# Patient Record
Sex: MALE | Race: OTHER | HISPANIC OR LATINO | URBAN - METROPOLITAN AREA
[De-identification: names, ages, dates, MRNs, and addresses within clinical notes are randomized per-mention and may not be internally consistent; named-entity substitution may affect disease eponyms.]

---

## 2022-04-10 ENCOUNTER — HISTORICAL (OUTPATIENT)
Dept: ADMINISTRATIVE | Facility: HOSPITAL | Age: 32
End: 2022-04-10
Payer: MEDICAID

## 2022-04-29 VITALS
OXYGEN SATURATION: 96 % | SYSTOLIC BLOOD PRESSURE: 124 MMHG | WEIGHT: 174.63 LBS | DIASTOLIC BLOOD PRESSURE: 77 MMHG | HEIGHT: 68 IN | BODY MASS INDEX: 26.47 KG/M2

## 2022-06-05 ENCOUNTER — HOSPITAL ENCOUNTER (EMERGENCY)
Facility: HOSPITAL | Age: 32
Discharge: HOME | End: 2022-06-05
Attending: STUDENT IN AN ORGANIZED HEALTH CARE EDUCATION/TRAINING PROGRAM
Payer: MEDICAID

## 2022-06-05 VITALS
OXYGEN SATURATION: 98 % | RESPIRATION RATE: 16 BRPM | TEMPERATURE: 98 F | SYSTOLIC BLOOD PRESSURE: 117 MMHG | DIASTOLIC BLOOD PRESSURE: 66 MMHG | HEART RATE: 52 BPM

## 2022-06-05 DIAGNOSIS — R11.2 NAUSEA VOMITING AND DIARRHEA: Primary | ICD-10-CM

## 2022-06-05 DIAGNOSIS — R19.7 NAUSEA VOMITING AND DIARRHEA: Primary | ICD-10-CM

## 2022-06-05 LAB
ALBUMIN SERPL-MCNC: 4.4 GM/DL (ref 3.5–5)
ALBUMIN/GLOB SERPL: 1.4 RATIO (ref 1.1–2)
ALP SERPL-CCNC: 96 UNIT/L (ref 40–150)
ALT SERPL-CCNC: 13 UNIT/L (ref 0–55)
APPEARANCE UR: CLEAR
AST SERPL-CCNC: 16 UNIT/L (ref 5–34)
BACTERIA #/AREA URNS AUTO: ABNORMAL /HPF
BASOPHILS # BLD AUTO: 0.01 X10(3)/MCL (ref 0–0.2)
BASOPHILS NFR BLD AUTO: 0.1 %
BILIRUB UR QL STRIP.AUTO: NEGATIVE MG/DL
BILIRUBIN DIRECT+TOT PNL SERPL-MCNC: 0.5 MG/DL
BUN SERPL-MCNC: 7.8 MG/DL (ref 8.9–20.6)
CALCIUM SERPL-MCNC: 9.7 MG/DL (ref 8.4–10.2)
CHLORIDE SERPL-SCNC: 99 MMOL/L (ref 98–107)
CO2 SERPL-SCNC: 31 MMOL/L (ref 22–29)
COLOR UR AUTO: ABNORMAL
CREAT SERPL-MCNC: 0.94 MG/DL (ref 0.73–1.18)
EOSINOPHIL # BLD AUTO: 0.01 X10(3)/MCL (ref 0–0.9)
EOSINOPHIL NFR BLD AUTO: 0.1 %
ERYTHROCYTE [DISTWIDTH] IN BLOOD BY AUTOMATED COUNT: 12.4 % (ref 11.5–17)
GLOBULIN SER-MCNC: 3.1 GM/DL (ref 2.4–3.5)
GLUCOSE SERPL-MCNC: 120 MG/DL (ref 74–100)
GLUCOSE UR QL STRIP.AUTO: NORMAL MG/DL
HCT VFR BLD AUTO: 43.8 % (ref 42–52)
HGB BLD-MCNC: 14.8 GM/DL (ref 14–18)
HYALINE CASTS #/AREA URNS LPF: ABNORMAL /LPF
IMM GRANULOCYTES # BLD AUTO: 0.03 X10(3)/MCL (ref 0–0.02)
IMM GRANULOCYTES NFR BLD AUTO: 0.3 % (ref 0–0.43)
KETONES UR QL STRIP.AUTO: NEGATIVE MG/DL
LEUKOCYTE ESTERASE UR QL STRIP.AUTO: NEGATIVE UNIT/L
LIPASE SERPL-CCNC: 13 U/L
LYMPHOCYTES # BLD AUTO: 0.59 X10(3)/MCL (ref 0.6–4.6)
LYMPHOCYTES NFR BLD AUTO: 6 %
MCH RBC QN AUTO: 30.7 PG (ref 27–31)
MCHC RBC AUTO-ENTMCNC: 33.8 MG/DL (ref 33–36)
MCV RBC AUTO: 90.9 FL (ref 80–94)
MONOCYTES # BLD AUTO: 0.49 X10(3)/MCL (ref 0.1–1.3)
MONOCYTES NFR BLD AUTO: 5 %
MUCOUS THREADS URNS QL MICRO: ABNORMAL /LPF
NEUTROPHILS # BLD AUTO: 8.6 X10(3)/MCL (ref 2.1–9.2)
NEUTROPHILS NFR BLD AUTO: 88.5 %
NITRITE UR QL STRIP.AUTO: NEGATIVE
NRBC BLD AUTO-RTO: 0 %
PH UR STRIP.AUTO: 8 [PH]
PLATELET # BLD AUTO: 247 X10(3)/MCL (ref 130–400)
PMV BLD AUTO: 10.1 FL (ref 9.4–12.4)
POTASSIUM SERPL-SCNC: 4.1 MMOL/L (ref 3.5–5.1)
PROT SERPL-MCNC: 7.5 GM/DL (ref 6.4–8.3)
PROT UR QL STRIP.AUTO: NEGATIVE MG/DL
RBC # BLD AUTO: 4.82 X10(6)/MCL (ref 4.7–6.1)
RBC #/AREA URNS AUTO: ABNORMAL /HPF
RBC UR QL AUTO: NEGATIVE UNIT/L
SODIUM SERPL-SCNC: 137 MMOL/L (ref 136–145)
SP GR UR STRIP.AUTO: 1.01
SQUAMOUS #/AREA URNS LPF: ABNORMAL /HPF
UROBILINOGEN UR STRIP-ACNC: NORMAL MG/DL
WBC # SPEC AUTO: 9.8 X10(3)/MCL (ref 4.5–11.5)
WBC #/AREA URNS AUTO: ABNORMAL /HPF

## 2022-06-05 PROCEDURE — 36415 COLL VENOUS BLD VENIPUNCTURE: CPT | Performed by: STUDENT IN AN ORGANIZED HEALTH CARE EDUCATION/TRAINING PROGRAM

## 2022-06-05 PROCEDURE — 83690 ASSAY OF LIPASE: CPT | Performed by: STUDENT IN AN ORGANIZED HEALTH CARE EDUCATION/TRAINING PROGRAM

## 2022-06-05 PROCEDURE — 99284 EMERGENCY DEPT VISIT MOD MDM: CPT | Mod: 25

## 2022-06-05 PROCEDURE — 85025 COMPLETE CBC W/AUTO DIFF WBC: CPT | Performed by: STUDENT IN AN ORGANIZED HEALTH CARE EDUCATION/TRAINING PROGRAM

## 2022-06-05 PROCEDURE — 25000003 PHARM REV CODE 250: Performed by: STUDENT IN AN ORGANIZED HEALTH CARE EDUCATION/TRAINING PROGRAM

## 2022-06-05 PROCEDURE — 81001 URINALYSIS AUTO W/SCOPE: CPT | Performed by: STUDENT IN AN ORGANIZED HEALTH CARE EDUCATION/TRAINING PROGRAM

## 2022-06-05 PROCEDURE — 63600175 PHARM REV CODE 636 W HCPCS: Performed by: STUDENT IN AN ORGANIZED HEALTH CARE EDUCATION/TRAINING PROGRAM

## 2022-06-05 PROCEDURE — 80053 COMPREHEN METABOLIC PANEL: CPT | Performed by: STUDENT IN AN ORGANIZED HEALTH CARE EDUCATION/TRAINING PROGRAM

## 2022-06-05 PROCEDURE — 96374 THER/PROPH/DIAG INJ IV PUSH: CPT

## 2022-06-05 PROCEDURE — 96361 HYDRATE IV INFUSION ADD-ON: CPT

## 2022-06-05 RX ORDER — BUSPIRONE HYDROCHLORIDE 10 MG/1
10 TABLET ORAL 3 TIMES DAILY
COMMUNITY
Start: 2022-05-11

## 2022-06-05 RX ORDER — OXCARBAZEPINE 600 MG/1
600 TABLET, FILM COATED ORAL 2 TIMES DAILY
COMMUNITY
Start: 2022-05-14

## 2022-06-05 RX ORDER — SERDEXMETHYLPHENIDATE AND DEXMETHYLPHENIDATE 5.2; 26.1 MG/1; MG/1
1 CAPSULE ORAL DAILY
COMMUNITY
Start: 2022-04-15

## 2022-06-05 RX ORDER — QUETIAPINE 150 MG/1
TABLET, FILM COATED, EXTENDED RELEASE ORAL NIGHTLY
COMMUNITY
Start: 2022-05-14

## 2022-06-05 RX ORDER — ONDANSETRON 2 MG/ML
4 INJECTION INTRAMUSCULAR; INTRAVENOUS
Status: COMPLETED | OUTPATIENT
Start: 2022-06-05 | End: 2022-06-05

## 2022-06-05 RX ORDER — ONDANSETRON 4 MG/1
4 TABLET, FILM COATED ORAL EVERY 6 HOURS
Qty: 12 TABLET | Refills: 0 | Status: SHIPPED | OUTPATIENT
Start: 2022-06-05

## 2022-06-05 RX ORDER — BUPROPION HYDROCHLORIDE 150 MG/1
150 TABLET ORAL DAILY
COMMUNITY
Start: 2022-05-19

## 2022-06-05 RX ORDER — ATOMOXETINE 25 MG/1
25 CAPSULE ORAL DAILY
COMMUNITY
Start: 2022-01-27

## 2022-06-05 RX ORDER — QUETIAPINE 400 MG/1
400 TABLET, FILM COATED, EXTENDED RELEASE ORAL NIGHTLY
COMMUNITY
Start: 2022-05-14

## 2022-06-05 RX ORDER — SERDEXMETHYLPHENIDATE AND DEXMETHYLPHENIDATE 7.8; 39.2 MG/1; MG/1
1 CAPSULE ORAL DAILY
COMMUNITY
Start: 2022-05-16

## 2022-06-05 RX ADMIN — ONDANSETRON 4 MG: 2 INJECTION INTRAMUSCULAR; INTRAVENOUS at 05:06

## 2022-06-05 RX ADMIN — SODIUM CHLORIDE 1000 ML: 9 INJECTION, SOLUTION INTRAVENOUS at 05:06

## 2022-06-05 NOTE — ED PROVIDER NOTES
Encounter Date: 6/5/2022       History     Chief Complaint   Patient presents with    Diarrhea     N/v/d and middle abd pain that started this morning. No distress.     32-year-old male with past medical history bipolar disorder presenting today with nausea, vomiting, diarrhea.  Said he woke this morning he had a couple episodes of diarrhea.  He then started having nausea and 1 episode of vomiting.  He took some liquid medicine from his mother but he cannot remember what it was.  He denies any fevers, chills.    The history is provided by the patient. No  was used.   General Illness   The current episode started just prior to arrival. The problem occurs continuously. The problem has been unchanged. Nothing relieves the symptoms. Nothing aggravates the symptoms. Associated symptoms include diarrhea, nausea and vomiting. Pertinent negatives include no fever, no abdominal pain, no congestion, no headaches, no cough, no shortness of breath, no discharge and no eye redness.     Review of patient's allergies indicates:   Allergen Reactions    Amoxicillin      Past Medical History:   Diagnosis Date    Bipolar disorder, unspecified     OCD (obsessive compulsive disorder)      No past surgical history on file.  No family history on file.  Social History     Tobacco Use    Smoking status: Current Every Day Smoker     Packs/day: 0.50     Types: Cigarettes    Smokeless tobacco: Never Used   Substance Use Topics    Drug use: Yes     Types: Marijuana     Review of Systems   Constitutional: Negative for activity change, chills and fever.   HENT: Negative for congestion and facial swelling.    Eyes: Negative for discharge and redness.   Respiratory: Negative for cough and shortness of breath.    Cardiovascular: Negative for chest pain and leg swelling.   Gastrointestinal: Positive for diarrhea, nausea and vomiting. Negative for abdominal distention and abdominal pain.   Genitourinary: Negative for dysuria  and hematuria.   Musculoskeletal: Negative for gait problem and neck stiffness.   Skin: Negative for color change and pallor.   Neurological: Negative for dizziness and headaches.       Physical Exam     Initial Vitals [06/05/22 1621]   BP Pulse Resp Temp SpO2   129/73 64 20 98.4 °F (36.9 °C) 99 %      MAP       --         Physical Exam    Nursing note and vitals reviewed.  Constitutional: He appears well-developed. He is not diaphoretic. No distress.   HENT:   Head: Normocephalic.   Eyes: Right eye exhibits no discharge. Left eye exhibits no discharge.   Neck: Neck supple.   Normal range of motion.  Cardiovascular: Normal rate, regular rhythm and normal heart sounds. Exam reveals no friction rub.    No murmur heard.  Pulmonary/Chest: Breath sounds normal. No respiratory distress. He has no wheezes. He has no rhonchi. He has no rales.   Abdominal: Abdomen is soft. Bowel sounds are normal. He exhibits no distension. There is no abdominal tenderness. There is no rebound.   Musculoskeletal:         General: No tenderness. Normal range of motion.      Cervical back: Normal range of motion and neck supple.     Neurological: He is alert and oriented to person, place, and time.   Skin: Skin is warm and dry.         ED Course   Procedures  Labs Reviewed   COMPREHENSIVE METABOLIC PANEL - Abnormal; Notable for the following components:       Result Value    Carbon Dioxide 31 (*)     Glucose Level 120 (*)     Blood Urea Nitrogen 7.8 (*)     All other components within normal limits   URINALYSIS, REFLEX TO URINE CULTURE - Abnormal; Notable for the following components:    Color, UA Light-Yellow (*)     Mucous, UA Trace (*)     All other components within normal limits   CBC WITH DIFFERENTIAL - Abnormal; Notable for the following components:    Lymph # 0.59 (*)     IG# 0.03 (*)     All other components within normal limits   LIPASE - Normal   CBC W/ AUTO DIFFERENTIAL    Narrative:     The following orders were created for panel  order CBC auto differential.  Procedure                               Abnormality         Status                     ---------                               -----------         ------                     CBC with Differential[[id number]]        Abnormal            Final result                 Please view results for these tests on the individual orders.   EXTRA TUBES    Narrative:     The following orders were created for panel order EXTRA TUBES.  Procedure                               Abnormality         Status                     ---------                               -----------         ------                     Light Blue Top Hold[[id number]]                              In process                 Red Top Hold[[id number]]                                     In process                 Lavender Top Hold[[id number]]                                In process                 Gold Top Hold[[id number]]                                    In process                 Pink Top Hold[[id number]]                                    In process                   Please view results for these tests on the individual orders.   LIGHT BLUE TOP HOLD   RED TOP HOLD   LAVENDER TOP HOLD   GOLD TOP HOLD   PINK TOP HOLD          Imaging Results    None          Medications   sodium chloride 0.9% bolus 1,000 mL (0 mLs Intravenous Stopped 6/5/22 1828)   ondansetron injection 4 mg (4 mg Intravenous Given 6/5/22 1728)     Medical Decision Making:   ED Management:  Pt presents to the ED with complaints of Abdominal pain     Differential considerations include: appendicitis, pancreatitis, cholecystitis, cholelithiasis, cholangitis,  nephrolithiasis, pyelonephritis, diverticulitis, PUD, DKA    Evaluated patient in the emergency department.  He was stable and well appearing.  On examination he had no abdominal tenderness to palpation anywhere.  His blood work is unremarkable.  His urine was unremarkable.  He improved after Zofran and fluids.  He was  instructed to follow up with his primary care doctor he verbalized understanding.  He is given return precautions.  He is being discharged home in stable condition at this time.                        Clinical Impression:   Final diagnoses:  [R11.2, R19.7] Nausea vomiting and diarrhea (Primary)          ED Disposition Condition    Discharge Stable        ED Prescriptions     Medication Sig Dispense Start Date End Date Auth. Provider    ondansetron (ZOFRAN) 4 MG tablet Take 1 tablet (4 mg total) by mouth every 6 (six) hours. 12 tablet 6/5/2022  Dean Padilla MD        Follow-up Information     Follow up With Specialties Details Why Contact Info    [facility] Internal Medicine Call in 1 day  [address] [facility] [id number] [phone]           Dean Padilla MD  06/05/22 2502

## 2024-05-24 ENCOUNTER — HOSPITAL ENCOUNTER (EMERGENCY)
Facility: HOSPITAL | Age: 34
Discharge: HOME | End: 2024-05-25
Attending: EMERGENCY MEDICINE
Payer: MEDICAID

## 2024-05-24 DIAGNOSIS — R41.82 ALTERED MENTAL STATUS, UNSPECIFIED ALTERED MENTAL STATUS TYPE: Primary | ICD-10-CM

## 2024-05-24 DIAGNOSIS — F12.90 MARIJUANA USE: ICD-10-CM

## 2024-05-24 LAB
ALBUMIN SERPL-MCNC: 4.3 G/DL (ref 3.5–5)
ALBUMIN/GLOB SERPL: 1.5 RATIO (ref 1.1–2)
ALP SERPL-CCNC: 60 UNIT/L (ref 40–150)
ALT SERPL-CCNC: 10 UNIT/L (ref 0–55)
ANION GAP SERPL CALC-SCNC: 12 MEQ/L
APAP SERPL-MCNC: <3 UG/ML (ref 10–30)
APAP SERPL-MCNC: <3 UG/ML (ref 10–30)
AST SERPL-CCNC: 15 UNIT/L (ref 5–34)
BACTERIA #/AREA URNS AUTO: ABNORMAL /HPF
BASOPHILS # BLD AUTO: 0.04 X10(3)/MCL
BASOPHILS NFR BLD AUTO: 0.5 %
BILIRUB SERPL-MCNC: 0.5 MG/DL
BILIRUB UR QL STRIP.AUTO: NEGATIVE
BUN SERPL-MCNC: 8.2 MG/DL (ref 8.9–20.6)
CALCIUM SERPL-MCNC: 8.9 MG/DL (ref 8.4–10.2)
CHLORIDE SERPL-SCNC: 95 MMOL/L (ref 98–107)
CLARITY UR: CLEAR
CO2 SERPL-SCNC: 24 MMOL/L (ref 22–29)
COLOR UR AUTO: YELLOW
CREAT SERPL-MCNC: 1.17 MG/DL (ref 0.73–1.18)
CREAT/UREA NIT SERPL: 7
EOSINOPHIL # BLD AUTO: 0.15 X10(3)/MCL (ref 0–0.9)
EOSINOPHIL NFR BLD AUTO: 2 %
ERYTHROCYTE [DISTWIDTH] IN BLOOD BY AUTOMATED COUNT: 12.4 % (ref 11.5–17)
ETHANOL SERPL-MCNC: <10 MG/DL
GFR SERPLBLD CREATININE-BSD FMLA CKD-EPI: >60 ML/MIN/1.73/M2
GLOBULIN SER-MCNC: 2.9 GM/DL (ref 2.4–3.5)
GLUCOSE SERPL-MCNC: 105 MG/DL (ref 74–100)
GLUCOSE UR QL STRIP: NORMAL
HCT VFR BLD AUTO: 40.4 % (ref 42–52)
HGB BLD-MCNC: 14.2 G/DL (ref 14–18)
HGB UR QL STRIP: NEGATIVE
IMM GRANULOCYTES # BLD AUTO: 0.02 X10(3)/MCL (ref 0–0.04)
IMM GRANULOCYTES NFR BLD AUTO: 0.3 %
KETONES UR QL STRIP: ABNORMAL
LEUKOCYTE ESTERASE UR QL STRIP: 25
LYMPHOCYTES # BLD AUTO: 1.95 X10(3)/MCL (ref 0.6–4.6)
LYMPHOCYTES NFR BLD AUTO: 25.5 %
MCH RBC QN AUTO: 31.8 PG (ref 27–31)
MCHC RBC AUTO-ENTMCNC: 35.1 G/DL (ref 33–36)
MCV RBC AUTO: 90.6 FL (ref 80–94)
MONOCYTES # BLD AUTO: 0.93 X10(3)/MCL (ref 0.1–1.3)
MONOCYTES NFR BLD AUTO: 12.2 %
MUCOUS THREADS URNS QL MICRO: ABNORMAL /LPF
NEUTROPHILS # BLD AUTO: 4.56 X10(3)/MCL (ref 2.1–9.2)
NEUTROPHILS NFR BLD AUTO: 59.5 %
NITRITE UR QL STRIP: NEGATIVE
NRBC BLD AUTO-RTO: 0 %
PH UR STRIP: 8 [PH]
PLATELET # BLD AUTO: 195 X10(3)/MCL (ref 130–400)
PMV BLD AUTO: 9.9 FL (ref 7.4–10.4)
POTASSIUM SERPL-SCNC: 3.8 MMOL/L (ref 3.5–5.1)
PROT SERPL-MCNC: 7.2 GM/DL (ref 6.4–8.3)
PROT UR QL STRIP: ABNORMAL
RBC # BLD AUTO: 4.46 X10(6)/MCL (ref 4.7–6.1)
RBC #/AREA URNS AUTO: ABNORMAL /HPF
SALICYLATES SERPL-MCNC: <5 MG/DL (ref 15–30)
SODIUM SERPL-SCNC: 131 MMOL/L (ref 136–145)
SP GR UR STRIP.AUTO: 1.03 (ref 1–1.03)
SQUAMOUS #/AREA URNS LPF: ABNORMAL /HPF
TSH SERPL-ACNC: 1.82 UIU/ML (ref 0.35–4.94)
UROBILINOGEN UR STRIP-ACNC: 2
WBC # SPEC AUTO: 7.65 X10(3)/MCL (ref 4.5–11.5)
WBC #/AREA URNS AUTO: ABNORMAL /HPF

## 2024-05-24 PROCEDURE — 82077 ASSAY SPEC XCP UR&BREATH IA: CPT | Performed by: EMERGENCY MEDICINE

## 2024-05-24 PROCEDURE — 80143 DRUG ASSAY ACETAMINOPHEN: CPT | Performed by: EMERGENCY MEDICINE

## 2024-05-24 PROCEDURE — 85025 COMPLETE CBC W/AUTO DIFF WBC: CPT | Performed by: EMERGENCY MEDICINE

## 2024-05-24 PROCEDURE — 81001 URINALYSIS AUTO W/SCOPE: CPT | Performed by: EMERGENCY MEDICINE

## 2024-05-24 PROCEDURE — 99284 EMERGENCY DEPT VISIT MOD MDM: CPT

## 2024-05-24 PROCEDURE — 80053 COMPREHEN METABOLIC PANEL: CPT | Performed by: EMERGENCY MEDICINE

## 2024-05-24 PROCEDURE — 84443 ASSAY THYROID STIM HORMONE: CPT | Performed by: EMERGENCY MEDICINE

## 2024-05-24 PROCEDURE — 80307 DRUG TEST PRSMV CHEM ANLYZR: CPT | Performed by: EMERGENCY MEDICINE

## 2024-05-24 PROCEDURE — 80179 DRUG ASSAY SALICYLATE: CPT | Performed by: EMERGENCY MEDICINE

## 2024-05-24 RX ORDER — BUSPIRONE HYDROCHLORIDE 15 MG/1
30 TABLET ORAL 2 TIMES DAILY
COMMUNITY
Start: 2024-03-05

## 2024-05-24 RX ORDER — AMPHETAMINE SULFATE 10 MG/1
1 TABLET ORAL 2 TIMES DAILY
COMMUNITY
Start: 2024-05-08

## 2024-05-24 RX ORDER — DIVALPROEX SODIUM 500 MG/1
TABLET, DELAYED RELEASE ORAL
COMMUNITY
Start: 2024-05-08

## 2024-05-25 VITALS
RESPIRATION RATE: 17 BRPM | TEMPERATURE: 98 F | SYSTOLIC BLOOD PRESSURE: 127 MMHG | HEIGHT: 69 IN | HEART RATE: 55 BPM | BODY MASS INDEX: 21.45 KG/M2 | WEIGHT: 144.81 LBS | OXYGEN SATURATION: 100 % | DIASTOLIC BLOOD PRESSURE: 77 MMHG

## 2024-05-25 LAB
AMPHET UR QL SCN: NEGATIVE
BARBITURATE SCN PRESENT UR: NEGATIVE
BENZODIAZ UR QL SCN: NEGATIVE
CANNABINOIDS UR QL SCN: POSITIVE
COCAINE UR QL SCN: NEGATIVE
FENTANYL UR QL SCN: NEGATIVE
MDMA UR QL SCN: NEGATIVE
OPIATES UR QL SCN: NEGATIVE
PCP UR QL: NEGATIVE
PH UR: 8 [PH] (ref 3–11)
SPECIFIC GRAVITY, URINE AUTO (.000) (OHS): 1.03 (ref 1–1.03)

## 2024-05-25 NOTE — ED PROVIDER NOTES
"Encounter Date: 5/24/2024    SCRIBE #1 NOTE: I, Roxie Mallory, am scribing for, and in the presence of,  Riky Salmon MD. I have scribed the following portions of the note - Other sections scribed: HPI, ROS, PE.       History     Chief Complaint   Patient presents with    Psychiatric Evaluation     Mother says he is not talking to her. He just looks at her. He dropped a cup today. Slow responses today. Recent medication change-Monday. His behavior changed today. She is concerned. With RN questioning he keeps repeating "[facility]", "[facility]". Very slow to respond. Unable to answer orientation questions. Awake. Alert. Ambulatory, slow, steady, independent gait.     Patient is a 34-year-old male with a history of OCD and bipolar disorder presenting to the ED with c/o AMS. Per the pt's mother who is bedside, the pt began acting altered today and is not at his baseline mental status. She states that he appears generally weak and is repeating himself. She notes that some of his psychiatric medications were recently adjust by his psychiatrist; however, she is unsure which ones or how they were adjusted. She notes that the pt smokes cigarettes and synthetic marijuana. The review of symptoms is limited due to the pt's mental status.     The history is provided by the patient and a relative. The history is limited by the condition of the patient. No  was used.     Review of patient's allergies indicates:   Allergen Reactions    Amoxicillin      Past Medical History:   Diagnosis Date    Bipolar disorder, unspecified     OCD (obsessive compulsive disorder)      No past surgical history on file.  No family history on file.  Social History     Tobacco Use    Smoking status: Every Day     Current packs/day: 0.50     Types: Cigarettes    Smokeless tobacco: Never   Substance Use Topics    Drug use: Yes     Types: Marijuana     Review of Systems   Unable to perform ROS: Mental status change       Physical Exam "     Initial Vitals [05/24/24 1920]   BP Pulse Resp Temp SpO2   109/68 63 18 97.8 °F (36.6 °C) 100 %      MAP       --         Physical Exam    Constitutional: He appears well-developed and well-nourished. No distress.   HENT:   Head: Normocephalic and atraumatic.   Eyes: Conjunctivae and EOM are normal. Pupils are equal, round, and reactive to light. Right eye exhibits no discharge. Left eye exhibits no discharge. No scleral icterus.   Neck: No tracheal deviation present.   Cardiovascular:  Normal rate, regular rhythm, normal heart sounds and intact distal pulses.           No murmur heard.  Pulmonary/Chest: Breath sounds normal. No stridor. No respiratory distress. He has no wheezes. He has no rales.   Abdominal: Abdomen is soft. He exhibits no distension. There is no abdominal tenderness. There is no rebound and no guarding.   Musculoskeletal:         General: No tenderness or edema. Normal range of motion.     Neurological: He is alert. He has normal strength and normal reflexes. No cranial nerve deficit.   The pt is disoriented to place, time, and situation.  He is repeating himself.  He has semi-appropriate responses to questions, including responding with 2019 to the year instead of 2024.      Skin: Skin is warm and dry. No rash noted. No erythema. No pallor.   Psychiatric: He has a normal mood and affect. His behavior is normal. Judgment and thought content normal.         ED Course   Procedures  Labs Reviewed   URINALYSIS, REFLEX TO URINE CULTURE - Abnormal; Notable for the following components:       Result Value    Protein, UA 1+ (*)     Ketones, UA 1+ (*)     Urobilinogen, UA 2.0 (*)     Leukocyte Esterase, UA 25 (*)     Mucous, UA Trace (*)     All other components within normal limits   ACETAMINOPHEN LEVEL - Abnormal; Notable for the following components:    Acetaminophen Level <3.0 (*)     All other components within normal limits   COMPREHENSIVE METABOLIC PANEL - Abnormal; Notable for the following  components:    Sodium 131 (*)     Chloride 95 (*)     Glucose 105 (*)     Blood Urea Nitrogen 8.2 (*)     All other components within normal limits   CBC WITH DIFFERENTIAL - Abnormal; Notable for the following components:    RBC 4.46 (*)     Hct 40.4 (*)     MCH 31.8 (*)     All other components within normal limits   ACETAMINOPHEN LEVEL - Abnormal; Notable for the following components:    Acetaminophen Level <3.0 (*)     All other components within normal limits   SALICYLATE LEVEL - Abnormal; Notable for the following components:    Salicylate Level <5.0 (*)     All other components within normal limits   DRUG SCREEN, URINE (BEAKER) - Abnormal; Notable for the following components:    Cannabinoids, Urine Positive (*)     All other components within normal limits    Narrative:     Cut off concentrations:    Amphetamines - 1000 ng/ml  Barbiturates - 200 ng/ml  Benzodiazepine - 200 ng/ml  Cannabinoids (THC) - 50 ng/ml  Cocaine - 300 ng/ml  Fentanyl - 1.0 ng/ml  MDMA - 500 ng/ml  Opiates - 300 ng/ml   Phencyclidine (PCP) - 25 ng/ml    Specimen submitted for drug analysis and tested for pH and specific gravity in order to evaluate sample integrity. Suspect tampering if specific gravity is <1.003 and/or pH is not within the range of 4.5 - 8.0  False negatives may result form substances such as bleach added to urine.  False positives may result for the presence of a substance with similar chemical structure to the drug or its metabolite.    This test provides only a PRELIMINARY analytical test result. A more specific alternate chemical method must be used in order to obtain a confirmed analytical result. Gas chromatography/mass spectrometry (GC/MS) is the preferred confirmatory method. Other chemical confirmation methods are available. Clinical consideration and professional judgement should be applied to any drug of abuse test result, particularly when preliminary positive results are used.    Positive results will be  confirmed only at the physicians request. Unconfirmed screening results are to be used only for medical purposes (treatment).        ALCOHOL,MEDICAL (ETHANOL) - Normal   TSH - Normal   CBC W/ AUTO DIFFERENTIAL    Narrative:     The following orders were created for panel order CBC auto differential.  Procedure                               Abnormality         Status                     ---------                               -----------         ------                     CBC with Differential[[id number]]       Abnormal            Final result                 Please view results for these tests on the individual orders.          Imaging Results              CT Head Without Contrast (Preliminary result)  Result time 05/25/24 01:10:22      Preliminary result by Bowen Mauro MD (05/25/24 01:10:22)                   Narrative:    START OF REPORT:  Technique: CT of the head was performed without intravenous contrast with axial as well as coronal and sagittal images.    Comparison: None.    Dosage Information: Automated Exposure Control was utilized 957.88 mGy.cm.    Clinical history: Mother says he is not talking to her. He just looks at her. He dropped a cup today. Slow responses today. Recent medication change-Monday. His behavior changed today. She is concerned. With RN questioning he keeps repeating [facility], [facility]. Very slow to respond. Unable to answer orientation questions. Awake. Alert. Ambulatory, slow, steady, independent gait.    Findings:  Hemorrhage: No acute intracranial hemorrhage is seen.  CSF spaces: The ventricles sulci and basal cisterns are within normal limits.  Brain parenchyma: Unremarkable with preservation of the grey white junction throughout. No acute infarct.  Cerebellum: Unremarkable.  Vascular: Unremarkable venous sinuses.  Sella and skull base: The sella appears to be within normal limits for age.  Cerebellopontine angles: Within normal limits.  Herniation: None.  Intracranial calcifications:  Incidental note is made of bilateral choroid plexus calcification. Incidental note is made of some pineal region calcification.  Calvarium: No acute linear or depressed skull fracture is seen.    Maxillofacial Structures:  Paranasal sinuses: The visualized paranasal sinuses appear clear with no mucoperiosteal thickening or air fluid levels identified.  Orbits: The orbits appear unremarkable.  Zygomatic arches: The zygomatic arches are intact and unremarkable.  Temporal bones and mastoids: The temporal bones and mastoids appear unremarkable.  TMJ: The mandibular condyles appear normally placed with respect to the mandibular fossa.      Impression:  1. No acute intracranial process identified. Details as above.                                         Medications - No data to display  Medical Decision Making  Differential diagnosis include but are not limited to: medication adverse reaction, drug intoxication, and electrolyte disturbance.     Amount and/or Complexity of Data Reviewed  Independent Historian: parent     Details: Per the pt's mother who is bedside, the pt began acting altered today and is not at his baseline mental status. She states that he appears generally weak and is repeating himself. She notes that some of his psychiatric medications were recently adjust by his psychiatrist; however, she is unsure which ones or how they were adjusted.  Labs: ordered. Decision-making details documented in ED Course.  Radiology: ordered.            Scribe Attestation:   Scribe #1: I performed the above scribed service and the documentation accurately describes the services I performed. I attest to the accuracy of the note.    Attending Attestation:           Physician Attestation for Scribe:  Physician Attestation Statement for Scribe #1: I, Riky Salmon MD, reviewed documentation, as scribed by Roxie Mallory in my presence, and it is both accurate and complete.             ED Course as of 05/25/24 0143   Sat  May 25, 2024   0141 Pt much more lucid, aaox3 advised to go back to original med dosing until f/u with psych on Monday, avoid thc, brother ok with plan at bedside [NL]      ED Course User Index  [NL] Riky Salmon MD                           Clinical Impression:  Final diagnoses:  [R41.82] Altered mental status, unspecified altered mental status type (Primary)  [F12.90] Marijuana use          ED Disposition Condition    Discharge Stable          ED Prescriptions    None       Follow-up Information       Follow up With Specialties Details Why Contact Info    PCP  Call in 1 day  follow up with PCP ni 1-2 days             Riky Salmon MD  05/25/24 0143

## 2024-05-25 NOTE — DISCHARGE INSTRUCTIONS
Go back to your original medication and dosing for now and f/u with your psychiatrist on Tuesday.    Avoid cbd and thc    Thanks for letting us take care of you today!  It is our goal to give you courteous care and to keep you comfortable and informed, if you have any questions before you leave I will be happy to try and answer them.    Here is some advice after your visit:      Your visit in the emergency department is NOT definitive care - please follow-up with your primary care doctor and/or specialist within 1-2 days.  Please return if you have any worsening in your condition or if you have any other concerns.    If you had radiology exams like an XRAY or CT in the emergency Department the interpreation on them may be preliminary - there may be less time sensitive findings on the reports please obtain these reports within 24 hours from the hospital or by using your out on your mobile phone to access records.  Bring these to your primary care doctor and/or specialist for further review of incidental findings.    Please review any LAB WORK from your visit today with your primary care physician.    If you were prescribed OPIATE PAIN MEDICATION - please understand of these medications can be addictive, you may fill less of the prescription was written for, you do not have to take the full prescription.  You may discard what you do not use.  Please seek help if you feel you are having problems with addiction.  Do not drive or operate heavy machinery if you are taking sedating medications.  Do not mix these medications with alcohol.      If you had a SPLINT placed in the emergency department if you have severe pain numbness tingling or discoloration of year digits please remove the splint and return to the emergency department for further evaluation as this may represent a sign of compromise to the nerves or blood vessels due to swelling.    If you had SUTURES in the emergency department please have them removed in  the prescribed time frame typically within 7-14 days.  You may shower but please do not bathe or swim.  Keep the wounds clean and dry and covered with a clean dressing.  Please return if he have any signs of infection like redness or drainage or pain at the suture site.    Please take the full course of  any ANTIBIOTICS you were prescribed - incomplete courses of antibiotics can cause resistance to antibiotics in the future which will make it difficult to treat any infections you may have.

## 2025-07-21 ENCOUNTER — OFFICE VISIT (OUTPATIENT)
Dept: INTERNAL MEDICINE | Facility: CLINIC | Age: 35
End: 2025-07-21
Payer: MEDICAID

## 2025-07-21 VITALS
HEART RATE: 65 BPM | HEIGHT: 69 IN | WEIGHT: 155.88 LBS | BODY MASS INDEX: 23.09 KG/M2 | SYSTOLIC BLOOD PRESSURE: 112 MMHG | OXYGEN SATURATION: 98 % | DIASTOLIC BLOOD PRESSURE: 68 MMHG | TEMPERATURE: 98 F | RESPIRATION RATE: 20 BRPM

## 2025-07-21 DIAGNOSIS — F41.9 ANXIETY: Primary | ICD-10-CM

## 2025-07-21 DIAGNOSIS — F90.9 ATTENTION DEFICIT HYPERACTIVITY DISORDER (ADHD), UNSPECIFIED ADHD TYPE: ICD-10-CM

## 2025-07-21 DIAGNOSIS — F31.9 BIPOLAR 1 DISORDER: ICD-10-CM

## 2025-07-21 PROCEDURE — 3008F BODY MASS INDEX DOCD: CPT | Mod: CPTII,,, | Performed by: FAMILY MEDICINE

## 2025-07-21 PROCEDURE — 1160F RVW MEDS BY RX/DR IN RCRD: CPT | Mod: CPTII,,, | Performed by: FAMILY MEDICINE

## 2025-07-21 PROCEDURE — 99214 OFFICE O/P EST MOD 30 MIN: CPT | Mod: PBBFAC | Performed by: FAMILY MEDICINE

## 2025-07-21 PROCEDURE — 3074F SYST BP LT 130 MM HG: CPT | Mod: CPTII,,, | Performed by: FAMILY MEDICINE

## 2025-07-21 PROCEDURE — 1159F MED LIST DOCD IN RCRD: CPT | Mod: CPTII,,, | Performed by: FAMILY MEDICINE

## 2025-07-21 PROCEDURE — 3078F DIAST BP <80 MM HG: CPT | Mod: CPTII,,, | Performed by: FAMILY MEDICINE

## 2025-07-21 PROCEDURE — 99204 OFFICE O/P NEW MOD 45 MIN: CPT | Mod: S$PBB,,, | Performed by: FAMILY MEDICINE

## 2025-07-21 RX ORDER — QUETIAPINE FUMARATE 300 MG/1
300 TABLET, FILM COATED ORAL 2 TIMES DAILY
COMMUNITY

## 2025-07-21 RX ORDER — PROPRANOLOL HYDROCHLORIDE 10 MG/1
10 TABLET ORAL 2 TIMES DAILY
Qty: 180 TABLET | Refills: 5 | Status: SHIPPED | OUTPATIENT
Start: 2025-07-21

## 2025-07-21 RX ORDER — PROPRANOLOL HYDROCHLORIDE 10 MG/1
10 TABLET ORAL 2 TIMES DAILY
COMMUNITY
Start: 2025-06-25 | End: 2025-07-21 | Stop reason: SDUPTHER

## 2025-07-21 RX ORDER — ARIPIPRAZOLE 960 MG/3.2ML
960 INJECTION, SUSPENSION, EXTENDED RELEASE INTRAMUSCULAR
COMMUNITY
Start: 2025-07-01

## 2025-07-21 RX ORDER — LISDEXAMFETAMINE DIMESYLATE 50 MG/1
50 CAPSULE ORAL EVERY MORNING
COMMUNITY
Start: 2025-07-09

## 2025-07-21 NOTE — PROGRESS NOTES
GILBERTO FARR   [facility]  [address] [address]      PATIENT NAME: Suhas Moore  : 1990  DATE: 25  MRN: [id number]      Reason for Visit / Chief Complaint: Establish Care (HX Bipolar and OCD)       History of Present Illness / Problem Focused Workflow   Mr. Suhas Moore is a 36 y/o  male who presents to clinic to establish care as a new pt. Denies any acute complaints today. Has bipolar d/o (mekhi), ADHD, and anxiety. Currently seeing BRAD Sharma; last clinic visit with her was 25. States he also completed labs around that same time - will obtain records from her office. Medication regimen is working well for him; reports he is not feeling manic anymore and better able to control his anxiety. Sleeping well, 8 hours per night.    C/o episodes of lower abdominal pain multiple times per week, for several months. Last episode occurred 1 mo ago. States that pain lasts 2-3 min, and usually goes away on own. No relation to eating; denies N/V/D.           Review of Systems     Review of Systems   All other systems reviewed and are negative.      Medications and Allergies     Medications  Medication List with Changes/Refills   Current Medications    ABILIFY ASIMTUFII 960 MG/3.2 ML SERS INJECTION    Inject 960 mg into the muscle.    BUSPIRONE (BUSPAR) 15 MG TABLET    Take 30 mg by mouth 2 (two) times daily.    DIVALPROEX (DEPAKOTE) 500 MG TBEC    Take by mouth.    LISDEXAMFETAMINE (VYVANSE) 50 MG CAPSULE    Take 50 mg by mouth every morning.    OXCARBAZEPINE (TRILEPTAL) 600 MG TAB    Take 600 mg by mouth 2 (two) times daily.    QUETIAPINE (SEROQUEL) 300 MG TAB    Take 300 mg by mouth 2 (two) times daily. 2 TAB ONCE DAILY AND AT BEDTIME   Changed and/or Refilled Medications    Modified Medication Previous Medication    PROPRANOLOL (INDERAL) 10 MG TABLET propranoloL (INDERAL) 10 MG tablet       Take 1  tablet (10 mg total) by mouth 2 (two) times daily.    Take 10 mg by mouth 2 (two) times daily.   Discontinued Medications    ATOMOXETINE (STRATTERA) 25 MG CAPSULE    Take 25 mg by mouth once daily.    AZSTARYS 26.1 MG- 5.2 MG CAP    Take 1 capsule by mouth once daily.    AZSTARYS 39.2 MG- 7.8 MG CAP    Take 1 capsule by mouth once daily.    BUPROPION (WELLBUTRIN XL) 150 MG TB24 TABLET    Take 150 mg by mouth once daily.    BUSPIRONE (BUSPAR) 10 MG TABLET    Take 10 mg by mouth 3 (three) times daily.    EVEKEO 10 MG TAB    Take 1 tablet by mouth 2 (two) times daily.    ONDANSETRON (ZOFRAN) 4 MG TABLET    Take 1 tablet (4 mg total) by mouth every 6 (six) hours.    QUETIAPINE (SEROQUEL XR) 150 MG TB24    Take by mouth nightly.    QUETIAPINE (SEROQUEL XR) 400 MG TB24    Take 400 mg by mouth nightly.         Allergies  Review of patient's allergies indicates:   Allergen Reactions    Amoxicillin        Physical Examination     Vitals:    07/21/25 1126   BP: 112/68   Pulse: 65   Resp: 20   Temp: 98.2 °F (36.8 °C)     Physical Exam  Vitals and nursing note reviewed.   Constitutional:       Appearance: He is normal weight.   HENT:      Head: Normocephalic and atraumatic.   Cardiovascular:      Rate and Rhythm: Normal rate and regular rhythm.      Pulses:           Posterior tibial pulses are 2+ on the right side and 2+ on the left side.      Heart sounds: Normal heart sounds, S1 normal and S2 normal. No murmur heard.  Pulmonary:      Effort: Pulmonary effort is normal. No accessory muscle usage or respiratory distress.      Breath sounds: Normal breath sounds and air entry.   Musculoskeletal:      Right lower leg: No edema.      Left lower leg: No edema.   Skin:     General: Skin is warm and dry.      Capillary Refill: Capillary refill takes less than 2 seconds.   Neurological:      Mental Status: He is alert and oriented to person, place, and time.      Cranial Nerves: Cranial nerves 2-12 are intact.      Sensory: Sensation  is intact.      Motor: Motor function is intact.      Coordination: Coordination is intact.      Gait: Gait is intact.   Psychiatric:         Attention and Perception: Attention and perception normal.         Mood and Affect: Mood and affect normal.         Speech: Speech normal.         Behavior: Behavior normal. Behavior is cooperative.         Thought Content: Thought content normal.         Cognition and Memory: Cognition and memory normal.         Judgment: Judgment normal.       Results     Lab Results   Component Value Date    WBC 7.65 05/24/2024    RBC 4.46 (L) 05/24/2024    HGB 14.2 05/24/2024    HCT 40.4 (L) 05/24/2024    MCV 90.6 05/24/2024    MCH 31.8 (H) 05/24/2024    MCHC 35.1 05/24/2024    RDW 12.4 05/24/2024     05/24/2024    MPV 9.9 05/24/2024     Sodium   Date Value Ref Range Status   05/24/2024 131 (L) 136 - 145 mmol/L Final     Potassium   Date Value Ref Range Status   05/24/2024 3.8 3.5 - 5.1 mmol/L Final     Chloride   Date Value Ref Range Status   05/24/2024 95 (L) 98 - 107 mmol/L Final     CO2   Date Value Ref Range Status   05/24/2024 24 22 - 29 mmol/L Final     Glucose   Date Value Ref Range Status   05/24/2024 105 (H) 74 - 100 mg/dL Final     Blood Urea Nitrogen   Date Value Ref Range Status   05/24/2024 8.2 (L) 8.9 - 20.6 mg/dL Final     Creatinine   Date Value Ref Range Status   05/24/2024 1.17 0.73 - 1.18 mg/dL Final     Calcium   Date Value Ref Range Status   05/24/2024 8.9 8.4 - 10.2 mg/dL Final     Protein Total   Date Value Ref Range Status   05/24/2024 7.2 6.4 - 8.3 gm/dL Final     Albumin   Date Value Ref Range Status   05/24/2024 4.3 3.5 - 5.0 g/dL Final     Bilirubin Total   Date Value Ref Range Status   05/24/2024 0.5 <=1.5 mg/dL Final     ALP   Date Value Ref Range Status   05/24/2024 60 40 - 150 unit/L Final     AST   Date Value Ref Range Status   05/24/2024 15 5 - 34 unit/L Final     ALT   Date Value Ref Range Status   05/24/2024 10 0 - 55 unit/L Final     Estimated  "GFR-Non    Date Value Ref Range Status   06/05/2022 >60 mls/min/1.73/m2 Final     No results found for: "CHOL"  No results found for: "HDL"  No results found for: "TRIG"  No results found for: "VLDL"  No results found for: "LDL"  Lab Results   Component Value Date    TSH 1.820 05/24/2024     Lab Results   Component Value Date    PHUR 8.0 05/24/2024    PROTEINUA 1+ (A) 05/24/2024    GLUCUA Normal 05/24/2024    KETONESU Negative 06/07/2021    OCCULTUA Negative 05/24/2024    NITRITE Negative 05/24/2024    LEUKOCYTESUR 25 (A) 05/24/2024     No results found for: "HGBA1C"      Assessment         ICD-10-CM ICD-9-CM   1. Anxiety  F41.9 300.00   2. Bipolar 1 disorder  F31.9 296.7   3. Attention deficit hyperactivity disorder (ADHD), unspecified ADHD type  F90.9 314.01       Plan      1. Anxiety  Assessment & Plan:  - Continue Buspirone 15 mg TID.   - Followed closely by BRAD Sharma.  - Has monthly appts with Psyc.    Orders:  -     propranoloL (INDERAL) 10 MG tablet; Take 1 tablet (10 mg total) by mouth 2 (two) times daily.  Dispense: 180 tablet; Refill: 5    2. Bipolar 1 disorder  Assessment & Plan:  - Continue Depakote 500 mg QD.   - Followed closely by BRAD Sharma.  - Has monthly appts with Psyc.        3. Attention deficit hyperactivity disorder (ADHD), unspecified ADHD type  Assessment & Plan:  - Continue Vyvanse 50 mg QD.  - Followed closely by BRAD Sharma.  - Has monthly appts with Psyc.        Future Appointments   Date Time Provider Department Center   9/22/2025  9:00 AM Cecille Humphries FNP [facility]        Signature:     MEHRDAD Sepulveda    [facility]  [address] [id number]    Date of encounter: 7/21/25    "

## 2025-07-24 NOTE — ASSESSMENT & PLAN NOTE
- Continue Depakote 500 mg QD.   - Followed closely by BRAD Sharma.  - Has monthly appts with Psyc.

## 2025-07-24 NOTE — ASSESSMENT & PLAN NOTE
- Continue Buspirone 15 mg TID.   - Followed closely by BRAD Sharma.  - Has monthly appts with Psyc.